# Patient Record
Sex: FEMALE | Race: WHITE | NOT HISPANIC OR LATINO | Employment: STUDENT | URBAN - METROPOLITAN AREA
[De-identification: names, ages, dates, MRNs, and addresses within clinical notes are randomized per-mention and may not be internally consistent; named-entity substitution may affect disease eponyms.]

---
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
## 2024-01-06 ENCOUNTER — APPOINTMENT (EMERGENCY)
Dept: RADIOLOGY | Facility: HOSPITAL | Age: 24
End: 2024-01-06
Payer: COMMERCIAL

## 2024-01-06 ENCOUNTER — HOSPITAL ENCOUNTER (EMERGENCY)
Facility: HOSPITAL | Age: 24
Discharge: HOME | End: 2024-01-07
Attending: EMERGENCY MEDICINE
Payer: COMMERCIAL

## 2024-01-06 VITALS
SYSTOLIC BLOOD PRESSURE: 129 MMHG | TEMPERATURE: 97.9 F | OXYGEN SATURATION: 98 % | HEIGHT: 66 IN | DIASTOLIC BLOOD PRESSURE: 71 MMHG | BODY MASS INDEX: 33.89 KG/M2 | HEART RATE: 88 BPM | RESPIRATION RATE: 20 BRPM

## 2024-01-06 DIAGNOSIS — R10.9 FLANK PAIN: Primary | ICD-10-CM

## 2024-01-06 DIAGNOSIS — R30.0 DYSURIA: ICD-10-CM

## 2024-01-06 DIAGNOSIS — R35.0 URINARY FREQUENCY: ICD-10-CM

## 2024-01-06 LAB
ANION GAP SERPL CALC-SCNC: 6 MEQ/L (ref 3–15)
BACTERIA URNS QL MICRO: 2 /HPF
BASOPHILS # BLD: 0.04 K/UL (ref 0.01–0.1)
BASOPHILS NFR BLD: 0.4 %
BILIRUB UR QL STRIP.AUTO: NEGATIVE MG/DL
BUN SERPL-MCNC: 13 MG/DL (ref 7–25)
CALCIUM SERPL-MCNC: 9.5 MG/DL (ref 8.6–10.3)
CHLORIDE SERPL-SCNC: 107 MEQ/L (ref 98–107)
CLARITY UR REFRACT.AUTO: CLEAR
CO2 SERPL-SCNC: 24 MEQ/L (ref 21–31)
COLOR UR AUTO: YELLOW
CREAT SERPL-MCNC: 0.7 MG/DL (ref 0.6–1.2)
DIFFERENTIAL METHOD BLD: NORMAL
EGFRCR SERPLBLD CKD-EPI 2021: >60 ML/MIN/1.73M*2
EOSINOPHIL # BLD: 0.06 K/UL (ref 0.04–0.36)
EOSINOPHIL NFR BLD: 0.7 %
ERYTHROCYTE [DISTWIDTH] IN BLOOD BY AUTOMATED COUNT: 11.8 % (ref 11.7–14.4)
GLUCOSE SERPL-MCNC: 77 MG/DL (ref 70–99)
GLUCOSE UR STRIP.AUTO-MCNC: NEGATIVE MG/DL
HCG UR QL: NEGATIVE
HCT VFR BLDCO AUTO: 38.8 % (ref 35–45)
HGB BLD-MCNC: 13 G/DL (ref 11.8–15.7)
HGB UR QL STRIP.AUTO: 1
HYALINE CASTS #/AREA URNS LPF: ABNORMAL /LPF
IMM GRANULOCYTES # BLD AUTO: 0.02 K/UL (ref 0–0.08)
IMM GRANULOCYTES NFR BLD AUTO: 0.2 %
KETONES UR STRIP.AUTO-MCNC: NEGATIVE MG/DL
LEUKOCYTE ESTERASE UR QL STRIP.AUTO: NEGATIVE
LYMPHOCYTES # BLD: 2.92 K/UL (ref 1.2–3.5)
LYMPHOCYTES NFR BLD: 31.6 %
MCH RBC QN AUTO: 30.9 PG (ref 28–33.2)
MCHC RBC AUTO-ENTMCNC: 33.5 G/DL (ref 32.2–35.5)
MCV RBC AUTO: 92.2 FL (ref 83–98)
MONOCYTES # BLD: 0.51 K/UL (ref 0.28–0.8)
MONOCYTES NFR BLD: 5.5 %
MUCOUS THREADS URNS QL MICRO: ABNORMAL /LPF
NEUTROPHILS # BLD: 5.68 K/UL (ref 1.7–7)
NEUTS SEG NFR BLD: 61.6 %
NITRITE UR QL STRIP.AUTO: NEGATIVE
NRBC BLD-RTO: 0 %
PDW BLD AUTO: 9.7 FL (ref 9.4–12.3)
PH UR STRIP.AUTO: 6.5 [PH]
PLATELET # BLD AUTO: 209 K/UL (ref 150–369)
POTASSIUM SERPL-SCNC: 3.8 MEQ/L (ref 3.5–5.1)
PROT UR QL STRIP.AUTO: NEGATIVE
RBC # BLD AUTO: 4.21 M/UL (ref 3.93–5.22)
RBC #/AREA URNS HPF: ABNORMAL /HPF
SODIUM SERPL-SCNC: 137 MEQ/L (ref 136–145)
SP GR UR REFRACT.AUTO: 1.01
SQUAMOUS URNS QL MICRO: ABNORMAL /HPF
UROBILINOGEN UR STRIP-ACNC: 0.2 EU/DL
WBC # BLD AUTO: 9.23 K/UL (ref 3.8–10.5)
WBC #/AREA URNS HPF: ABNORMAL /HPF

## 2024-01-06 PROCEDURE — 96374 THER/PROPH/DIAG INJ IV PUSH: CPT | Mod: 59

## 2024-01-06 PROCEDURE — 63600105 HC IODINE BASED CONTRAST: Mod: JZ

## 2024-01-06 PROCEDURE — 84703 CHORIONIC GONADOTROPIN ASSAY: CPT | Performed by: EMERGENCY MEDICINE

## 2024-01-06 PROCEDURE — G1004 CDSM NDSC: HCPCS

## 2024-01-06 PROCEDURE — 80048 BASIC METABOLIC PNL TOTAL CA: CPT | Performed by: EMERGENCY MEDICINE

## 2024-01-06 PROCEDURE — 99284 EMERGENCY DEPT VISIT MOD MDM: CPT | Mod: 25

## 2024-01-06 PROCEDURE — 3E0333Z INTRODUCTION OF ANTI-INFLAMMATORY INTO PERIPHERAL VEIN, PERCUTANEOUS APPROACH: ICD-10-PCS | Performed by: EMERGENCY MEDICINE

## 2024-01-06 PROCEDURE — 63600000 HC DRUGS/DETAIL CODE

## 2024-01-06 PROCEDURE — 85025 COMPLETE CBC W/AUTO DIFF WBC: CPT | Performed by: EMERGENCY MEDICINE

## 2024-01-06 PROCEDURE — 36415 COLL VENOUS BLD VENIPUNCTURE: CPT

## 2024-01-06 PROCEDURE — 85025 COMPLETE CBC W/AUTO DIFF WBC: CPT

## 2024-01-06 PROCEDURE — 81001 URINALYSIS AUTO W/SCOPE: CPT | Performed by: EMERGENCY MEDICINE

## 2024-01-06 RX ORDER — LEVALBUTEROL INHALATION SOLUTION 0.63 MG/3ML
SOLUTION RESPIRATORY (INHALATION)
COMMUNITY
Start: 2020-11-20

## 2024-01-06 RX ORDER — QUETIAPINE FUMARATE 25 MG/1
1 TABLET, FILM COATED ORAL NIGHTLY
COMMUNITY
Start: 2023-09-18

## 2024-01-06 RX ORDER — KETOROLAC TROMETHAMINE 30 MG/ML
30 INJECTION, SOLUTION INTRAMUSCULAR; INTRAVENOUS ONCE
Status: COMPLETED | OUTPATIENT
Start: 2024-01-06 | End: 2024-01-06

## 2024-01-06 RX ORDER — IOPAMIDOL 755 MG/ML
100 INJECTION, SOLUTION INTRAVASCULAR
Status: COMPLETED | OUTPATIENT
Start: 2024-01-06 | End: 2024-01-06

## 2024-01-06 RX ORDER — LORAZEPAM 0.5 MG/1
TABLET ORAL
COMMUNITY
End: 2024-12-12 | Stop reason: ALTCHOICE

## 2024-01-06 RX ORDER — BUDESONIDE 0.5 MG/2ML
INHALANT ORAL
COMMUNITY
Start: 2020-11-20

## 2024-01-06 RX ORDER — EPINEPHRINE 0.3 MG/.3ML
INJECTION SUBCUTANEOUS
COMMUNITY

## 2024-01-06 RX ORDER — NITROFURANTOIN 25; 75 MG/1; MG/1
CAPSULE ORAL
COMMUNITY
Start: 2023-08-29 | End: 2024-11-22 | Stop reason: ALTCHOICE

## 2024-01-06 RX ADMIN — IOPAMIDOL 100 ML: 755 INJECTION, SOLUTION INTRAVENOUS at 23:31

## 2024-01-06 RX ADMIN — KETOROLAC TROMETHAMINE 30 MG: 30 INJECTION INTRAMUSCULAR; INTRAVENOUS at 23:21

## 2024-01-06 ASSESSMENT — ENCOUNTER SYMPTOMS
CHILLS: 0
FATIGUE: 0
COLOR CHANGE: 0
FEVER: 0
HEADACHES: 0
COUGH: 0
FLANK PAIN: 1
LIGHT-HEADEDNESS: 0
PALPITATIONS: 0
SEIZURES: 0
HEMATURIA: 1
EYE PAIN: 0
CONSTIPATION: 0
ARTHRALGIAS: 0
DYSURIA: 1
SORE THROAT: 0
DIARRHEA: 0
DIFFICULTY URINATING: 0
CONFUSION: 0
SHORTNESS OF BREATH: 0
NAUSEA: 1
FREQUENCY: 1
BACK PAIN: 0
WEAKNESS: 0
ABDOMINAL PAIN: 0
VOMITING: 0
DIAPHORESIS: 0
DIZZINESS: 0

## 2024-01-07 NOTE — DISCHARGE INSTRUCTIONS
Your evaluated in the emergency department for flank pain, dysuria, and urinary frequency.  Your CAT scan did not show evidence of kidney stones or pyelonephritis.  Continue taking your antibiotics as directed.  Please follow-up with PCP within 2 to 3 days for reevaluation.  Return to emergency department if you experience worsening pain, difficulty urinating, flank pain, fever 100.4 or greater, or any other concerning symptoms.

## 2024-01-07 NOTE — ED PROVIDER NOTES
HPI   HISTORY OF PRESENT ILLNESS     Patient is a 23-year-old female with past medical history of anorexia and asthma who arrives to the emergency department for right flank pain, urinary frequency, dysuria, and nausea x 3 days.  Patient states she gets frequent UTIs so she called her PCP who prescribed Macrobid and is currently on day 3 of her antibiotic.  Patient states her symptoms were not improving so she came into the ED for reevaluation.  No back pain red flags.  No treatments prior to arrival.  Patient reports pain is a constant, throbbing, aching, 3 out of 10 pain with no radiation or aggravating factors.  Patient had bilateral CVA tenderness on exam.  Patient denies chest pain, shortness of breath, palpitations, cough, fever, chills, headache, vision changes, diaphoresis, lightheadedness, dizziness, numbness, tingling, vomiting, diarrhea, or constipation.      History provided by:  Patient  Flank Pain  Associated symptoms: nausea    Associated symptoms: no abdominal pain, no chest pain, no cough, no diarrhea, no ear pain, no fatigue, no fever, no headaches, no rash, no shortness of breath, no sore throat and no vomiting          Patient History   PAST HISTORY     Reviewed from Nursing Triage:       Past Medical History:   Diagnosis Date   • Anorexia     h/o anorexia last year, not currently anorexic   • Asthma    • Concussion     3 years ago   • Leg length discrepancy    • Syncope, vasovagal     November 2018       History reviewed. No pertinent surgical history.    History reviewed. No pertinent family history.    Social History     Tobacco Use   • Smoking status: Never   • Smokeless tobacco: Never   Substance Use Topics   • Alcohol use: Never   • Drug use: Never         Review of Systems   REVIEW OF SYSTEMS     Review of Systems   Constitutional: Negative for chills, diaphoresis, fatigue and fever.   HENT: Negative for ear pain and sore throat.    Eyes: Negative for pain and visual disturbance.    Respiratory: Negative for cough and shortness of breath.    Cardiovascular: Negative for chest pain and palpitations.   Gastrointestinal: Positive for nausea. Negative for abdominal pain, constipation, diarrhea and vomiting.   Genitourinary: Positive for dysuria, flank pain (Right flank pain), frequency and hematuria. Negative for decreased urine volume, difficulty urinating, pelvic pain, urgency, vaginal bleeding, vaginal discharge and vaginal pain.   Musculoskeletal: Negative for arthralgias, back pain and gait problem.   Skin: Negative for color change and rash.   Neurological: Negative for dizziness, seizures, syncope, weakness, light-headedness and headaches.   Psychiatric/Behavioral: Negative for confusion.   All other systems reviewed and are negative.        VITALS     ED Vitals    Date/Time Temp Pulse Resp BP SpO2 [facility]   01/06/24 2215 36.6 °C (97.9 °F) 88 20 129/71 98 % JLR        Pulse Ox %: 98 % (01/06/24 2228)  Pulse Ox Interpretation: Normal (01/06/24 2228)  Heart Rate: 88 (01/06/24 2228)  Rhythm Strip Interpretation: Normal Sinus Rhythm (01/06/24 2228)     Physical Exam   PHYSICAL EXAM     Physical Exam  Vitals and nursing note reviewed.   Constitutional:       General: She is not in acute distress.     Appearance: Normal appearance. She is well-developed. She is not ill-appearing, toxic-appearing or diaphoretic.   HENT:      Head: Normocephalic and atraumatic.      Nose: No congestion or rhinorrhea.      Mouth/Throat:      Mouth: Mucous membranes are moist.   Eyes:      Extraocular Movements: Extraocular movements intact.      Conjunctiva/sclera: Conjunctivae normal.      Pupils: Pupils are equal, round, and reactive to light.   Cardiovascular:      Rate and Rhythm: Normal rate and regular rhythm.      Pulses: Normal pulses.      Heart sounds: Normal heart sounds.   Pulmonary:      Effort: Pulmonary effort is normal. No respiratory distress.      Breath sounds: Normal breath sounds.   Abdominal:       General: Bowel sounds are normal.      Palpations: Abdomen is soft.      Tenderness: There is no abdominal tenderness. There is right CVA tenderness and left CVA tenderness. There is no guarding or rebound.   Musculoskeletal:         General: No tenderness. Normal range of motion.      Cervical back: Normal range of motion and neck supple.      Right lower leg: No edema.      Left lower leg: No edema.   Skin:     General: Skin is warm and dry.      Capillary Refill: Capillary refill takes less than 2 seconds.   Neurological:      General: No focal deficit present.      Mental Status: She is alert and oriented to person, place, and time.   Psychiatric:         Mood and Affect: Mood normal.         Behavior: Behavior normal.           PROCEDURES     Procedures     DATA     Results     Procedure Component Value Units Date/Time    Urinalysis with Reflex Culture [[id number]]  (Abnormal) Collected: 01/06/24 2230    Specimen: Urine, Clean Catch Updated: 01/06/24 2244    Narrative:      The following orders were created for panel order Urinalysis with Reflex Culture.  Procedure                               Abnormality         Status                     ---------                               -----------         ------                     UA Reflex to Culture (Ma...[[id number]]  Abnormal            Final result               UA Microscopic[[id number]]               Abnormal            Final result                 Please view results for these tests on the individual orders.    UA Microscopic [[id number]]  (Abnormal) Collected: 01/06/24 2230    Specimen: Urine, Clean Catch Updated: 01/06/24 2244     RBC, Urine 5 TO 9 /HPF      WBC, Urine 0 TO 3 /HPF      Squamous Epithelial Rare /hpf      Hyaline Cast None Seen /lpf      Bacteria, Urine +2 /HPF      Mucus Rare /LPF     UA Reflex to Culture (Macroscopic) [[id number]]  (Abnormal) Collected: 01/06/24 2230    Specimen: Urine, Clean Catch Updated: 01/06/24 2239     Color, Urine Yellow      Clarity, Urine Clear     Specific Gravity, Urine 1.010     pH, Urine 6.5     Leukocyte Esterase Negative     Comment: Results can be falsely negative due to high specific gravity, some antibiotics, glucose >3 g/dl, or WBC other than neutrophils.        Nitrite, Urine Negative     Protein, Urine Negative     Glucose, Urine Negative mg/dL      Ketones, Urine Negative mg/dL      Urobilinogen, Urine 0.2 EU/dL      Bilirubin, Urine Negative mg/dL      Blood, Urine +1     Comment: The sensitivity of the occult blood test is equivalent to approximately 4 intact RBC/HPF.       CBC and differential [[id number]] Collected: 01/06/24 2221    Specimen: Blood, Venous Updated: 01/06/24 2230     WBC 9.23 K/uL      RBC 4.21 M/uL      Hemoglobin 13.0 g/dL      Hematocrit 38.8 %      MCV 92.2 fL      MCH 30.9 pg      MCHC 33.5 g/dL      RDW 11.8 %      Platelets 209 K/uL      MPV 9.7 fL      Differential Type Auto     nRBC 0.0 %      Immature Granulocytes 0.2 %      Neutrophils 61.6 %      Lymphocytes 31.6 %      Monocytes 5.5 %      Eosinophils 0.7 %      Basophils 0.4 %      Immature Granulocytes, Absolute 0.02 K/uL      Neutrophils, Absolute 5.68 K/uL      Lymphocytes, Absolute 2.92 K/uL      Monocytes, Absolute 0.51 K/uL      Eosinophils, Absolute 0.06 K/uL      Basophils, Absolute 0.04 K/uL     Basic metabolic panel [[id number]] Collected: 01/06/24 2221    Specimen: Blood, Venous Updated: 01/06/24 2226    BhCG, Serum, Qual (if menstruating female and no urine) [[id number]] Collected: 01/06/24 2221    Specimen: Blood, Venous Updated: 01/06/24 2226          Imaging Results    None         No orders to display       Scoring tools                                  ED Course & MDM   MDM / ED COURSE / CLINICAL IMPRESSION / DISPO     Medical Decision Making  Impression: Patient is alert and oriented.  Patient presents to the ED for right flank pain, urinary frequency, dysuria, and nausea x 3 days.  Patient states she gets frequent UTIs  so she called her PCP who prescribed Macrobid and is currently on day 3 of her antibiotic.  Patient states her symptoms were not improving so she came into the ED for reevaluation.  No back pain red flags.  No treatments prior to arrival.  Differential diagnoses include UTI, renal colic, pyelonephritis.  Plan to obtain UA, laboratory studies, and CT ab/pelvis.  PERRLA.  Lungs are clear in all fields.  Patient can ambulate without difficulty.  Bilateral CVA tenderness.  Bowel sounds present all 4 quadrants.  Abdomen soft and nontender.  Patient is afebrile on exam.  VSS with SpO2 of 98% on room air. Blood +1, RBC 5-9, bacteria +2.  CBC obtained unremarkable.  BMP obtained unremarkable.  CT ab/pelvis with pulmonary impression: Increased stool.  No bowel distention, free fluid, free air or hydronephrosis.  Normal appendix.  No thickening of the urinary bladder wall.  Plan: CBC, CMP, pregnancy test, UA, CT ab/pelvis, reassess  Dispo: Discharge home with return precautions.  Follow-up with PCP in 2 to 3 days for reevaluation.  Continue taking antibiotics as directed.  Diagnosis and management discussed with patient who understood and is comfortable with discharge plan.    Dysuria: acute illness or injury  Flank pain: acute illness or injury  Urinary frequency: acute illness or injury  Amount and/or Complexity of Data Reviewed  Labs: ordered. Decision-making details documented in ED Course.  Radiology: ordered and independent interpretation performed.      Risk  Prescription drug management.          Vital Signs Review: Vital signs have been reviewed. The oxygen saturation is SpO2: 98 % which is normal.    ED Course as of 01/06/24 2356   Sat Jan 06, 2024   2228 Patient evaluated for right flank pain, urinary frequency, dysuria, and nausea x 3 days.  Patient states she gets frequent UTIs so she called her PCP who prescribed Macrobid and is currently on day 3 of her antibiotic.  Patient states her symptoms were not  improving so she came into the ED for reevaluation.  No back pain red flags.  No treatments prior to arrival. [JG]   2247 Blood, Urine(!): +1 [JG]   2247 RBC, Urine(!): 5 TO 9 [JG]   2247 Bacteria, Urine(!): +2 [JG]   2353 CT ab/pelvis Patient Name: Kishan Cade  Exam(s): a/p standard CT  MR#: [id number]       History: Flank pain, r/o pyelonephritis Patient is a 23-year-old female with past medical history of anorexia and asthma who arrives to the emergency department for right flank pain, urinary frequency, dysuria, and nausea x 3 days.  Patient states she gets frequent UTIs so she called her PCP who prescribed Macrobid and is currently on day 3 of her antibiotic.  Patient states her symptoms were not improving so she came into the ED for reevaluation.  No back pain red flags.  No treatments prior to arrival.  Patient reports pain is a constant, throbbing, aching, 3 out of 10 pain with no radiation or aggravating factors.  Patient had bilateral CVA tenderness on exam.  Patient denies chest pain, shortness of breath, palpitations, cough, fever, chills, headache, vision changes, diaphoresis, lightheadedness, dizziness, numbness, tingling, vomiting, diarrhea, or constipation.    Preliminary Impression:    Increased stool.    No bowel distention, free fluid, free air or hydronephrosis. Normal appendix.    Normal enhancement and appearance to the kidneys. No perinephric infiltration. No thickening of the urinary bladder wall.      Interpreted by: Yana Lovell MD, Jan 06, 2024 11:50 PM     [JG]   2935 Diagnosis and management discussed with patient who understood and is comfortable with discharge plan. [JG]      ED Course User Index  [JG] Evan Vieira CRNP     Clinical Impression      None               Evan Vieira CRNP  01/07/24 0000

## 2024-11-22 ENCOUNTER — OFFICE VISIT (OUTPATIENT)
Dept: GYNECOLOGY | Facility: CLINIC | Age: 24
End: 2024-11-22
Payer: COMMERCIAL

## 2024-11-22 VITALS
BODY MASS INDEX: 31.82 KG/M2 | HEART RATE: 97 BPM | OXYGEN SATURATION: 99 % | DIASTOLIC BLOOD PRESSURE: 78 MMHG | WEIGHT: 191 LBS | HEIGHT: 65 IN | TEMPERATURE: 97.6 F | SYSTOLIC BLOOD PRESSURE: 120 MMHG

## 2024-11-22 DIAGNOSIS — R30.0 DYSURIA: Primary | ICD-10-CM

## 2024-11-22 DIAGNOSIS — N94.819 VULVODYNIA: ICD-10-CM

## 2024-11-22 DIAGNOSIS — R10.2 PELVIC PAIN IN FEMALE: ICD-10-CM

## 2024-11-22 DIAGNOSIS — Z80.3 FAMILY HISTORY OF BREAST CANCER: ICD-10-CM

## 2024-11-22 PROCEDURE — 99205 OFFICE O/P NEW HI 60 MIN: CPT | Performed by: PHYSICIAN ASSISTANT

## 2024-11-22 PROCEDURE — 3008F BODY MASS INDEX DOCD: CPT | Performed by: PHYSICIAN ASSISTANT

## 2024-11-22 ASSESSMENT — PAIN SCALES - GENERAL: PAINLEVEL_OUTOF10: 0-NO PAIN

## 2024-11-22 NOTE — PROGRESS NOTES
[facility] Problem Note      Visit Date: 11/22/2024   Kishan Cade is 24 y.o. female presenting today for Endometriosis      Referral: Naomie Ferrer DPT      Pertinent History:   Pt G0, she/her, presents with her friend Emily, to discuss pelvic pain, urinary symptoms, and suspicion for endometriosis.  She reports her symptoms got significantly worse about 2 years ago following a kidney infection.  Her primary concern is urinary symptoms.  She describes daily urinary symptoms associated with burning, frequency.  They will worsen around her periods and impact her quality of life.  She has seen urogynecology, Dr. Woo at [facility].  She had a cystoscopy that diagnosed her with interstitial cystitis.  She saw a specialist Dr. Suri Cole, who referred her to pelvic floor PT. she has been doing pelvic floor PT regularly for 3 months with some minimal benefit in symptoms.    Menarche 15 years old and she reports a lifetime history of severe dysmenorrhea with associated nausea and vomiting.  She was started on a OLIVIA in eighth grade due to her severe menstrual symptoms and was on that for many years.  She transition to Kyleena IUD in 2019 due to still having bleeding and cramping and emotional side effects on the pills.  Initially did well on the Kyleena for about 3 years-she was amenorrheic and had improvement in her pain.  Then she started to have daily noncyclic pain and had the IUD removed.  She has not been on hormonal contraceptive for the last 1.5 years.  In this time her menstrual and urinary symptoms have worsened.  She is dating a long-term male partner at this time.  Is unsure about future fertility plans.      Menstrual History  Patient states that her periods occur every 28-30 days  Her periods are regular   She states her periods are moderate requiring her to use tampon every 3-4 hours on her two heavier days  She is not currently using birth control - condoms, timed intercourse  She does not  "have a known history of fibroids      Urinary Symptoms  Frequency: Yes  Dysuria: Yes - significant \"burning\"  Hematuria: Yes    Bowel Symptoms  Bowel movements are irregular occurring about every 3-5 days   Constipation: Yes  Stool softener: No - has hx of eating disorder so hesitant to use laxatives  Diarrhea: No  Dyschezia: Yes  Hematochezia: No  Upper GI symptoms: nausea, vomiting    Sexual Activity  Patient is sexually active  Dyspareunia: Yes, describe: most notable is burning the day after she has sex    Lower extremity symptoms:   Left hip pain - has hx of hip dysplasia    Upper abdominal/Thoracic symptoms:  no    Previous medical therapies:  OLIVIA, Kyleena IUD      Imaging Studies    Previous Ultrasound yes: Findings: 2021, no abnormal findings      Abd/Pelvic CTS:  Impression        No acute findings in the abdomen or pelvis. No measurable urolithiasis.  Narrative    CT OF THE ABDOMEN AND PELVIS WITHOUT CONTRAST    CLINICAL INFORMATION: Flank pain    PROCEDURE: CT examination of the abdomen and pelvis was performed without intravenous contrast. Enteric contrast was not administered.    INTRAVENOUS CONTRAST: Not administered.    COMPARISON: None    The lack of intravenous contrast limits the evaluation of the solid organs.    FINDINGS:    LOWER THORAX: The lung bases are clear.    LIVER: Normal in size and configuration.        BILE DUCTS: No intrahepatic or extrahepatic bile duct dilation.    GALLBLADDER: No calcified gallstones. Normal wall thickness.    PANCREAS: Within limits of an unenhanced exam, no focal pancreatic mass is identified.    SPLEEN: Normal size.    ADRENAL GLANDS: Within normal limits.    KIDNEYS/URETERS: No hydroureteronephrosis. No radiopaque urinary tract calculi.    BLADDER: Within normal limits.    REPRODUCTIVE ORGANS: The uterus is grossly normal. No adnexal mass.    BOWEL: No bowel dilatation. No adjacent inflammatory change. Normal appendix.    PERITONEUM/RETROPERITONEUM: No fluid " collection, ascites, or pneumoperitoneum.    LYMPH NODES: No abdominal or pelvic lymphadenopathy, by imaging size criteria.    VESSELS: Limited assessment without contrast. No abdominal aortic aneurysm [ABAN00].    ABDOMINAL/PELVIC WALL: Within limits of normal.    BONES: No aggressive osseous lesion.      Stated medical history: anxiety, depression (sees therapist and psychyatrist regularly), eating disorder (well controlled), dysuria, Hashimotos    Stated surgical history: wisdom teeth, septal repair    Stated social history: Nonsmoker    Patient has a history of surgically confirmed endometriosis: no    Current Medical Treatments:  Non-Narcotics: nsaid, tylenol  Narcotics: no  Hormonal: no  Neuroleptics: no        Menstrual History:  OB History          0    Para   0    Term   0       0    AB   0    Living   0         SAB   0    IAB   0    Ectopic   0    Multiple   0    Live Births   0                Patient's last menstrual period was 2024.     Fam hx:   Mother with hx of breast cancer at 51 yo - unsure about genetic testing, aaw  PGM and all pat great aunts with breast cancer, PGM dx <51 yo, no genetic testing done    Medications:   Current Outpatient Medications:     budesonide (PULMICORT) 0.5 mg/2 mL nebulizer solution, inhale contents of 1 vial ( 2 milliliters ) in nebulizer twice a day if needed, Disp: , Rfl:     desvenlafaxine succinate (PRISTIQ) 50 mg 24 hr ER tablet, Take 50 mg by mouth daily. 100 mg day, Disp: , Rfl:     EPINEPHrine (EPIPEN) 0.3 mg/0.3 mL injection syringe, INJECT ONE PEN INJECTOR INTRAMUSCULARLY ONCE AS AS NEEDED, Disp: , Rfl:     levalbuterol (XOPENEX) 0.63 mg/3 mL nebulizer solution, inhale orally contents of 1 vial in nebulizer every 6 hours if needed, Disp: , Rfl:     LORazepam (ATIVAN) 0.5 mg tablet, take 1 tablet by mouth once daily if needed for anxiety, Disp: , Rfl:     QUEtiapine (SEROquel) 25 mg tablet, Take 1 tablet by mouth nightly. Half of 25 mg, Disp:  ", Rfl:     fluticasone propionate (FLONASE) 50 mcg/actuation nasal spray, Administer 1 spray into each nostril daily., Disp: 16 g, Rfl: 0    levothyroxine (SYNTHROID) 75 mcg tablet, Take daily on an empty stomach, Disp: , Rfl:     lithium 600 mg capsule, Take 600 mg by mouth 2 (two) times a day., Disp: , Rfl:     lithium carbonate 150 mg capsule, TAKE 1 CAPSULE BY MOUTH EVERY EVENING TAKE WITH 600 MG IN THE EVENING, Disp: , Rfl:     lumateperone (CAPLYTA) 42 mg capsule, Take 42 mg by mouth daily., Disp: , Rfl:     nitrofurantoin, macrocrystal-monohydrate, (MACROBID) 100 mg capsule, Take 1 capsule every 12 hours by oral route for 5 days., Disp: , Rfl:     Allergies: is allergic to gluten and tree nuts.     Past Medical History:  has a past medical history of Anorexia, Asthma, Concussion, Leg length discrepancy, and Syncope, vasovagal. Hashimotos  Past Surgical History:  has no past surgical history on file.  Social History:   Social History     Tobacco Use    Smoking status: Never    Smokeless tobacco: Never   Substance Use Topics    Alcohol use: Never    Drug use: Never         Review of Systems       Visit Vitals  /78 (BP Location: Right upper arm, Patient Position: Sitting)   Pulse 97   Temp 36.4 °C (97.6 °F) (Temporal)   Ht 1.651 m (5' 5\")   Wt 86.6 kg (191 lb)   LMP 11/01/2024   SpO2 99%   BMI 31.78 kg/m²       Physical Exam  Constitutional: Patient appears healthy, alert, not in any sign of acute distress, and well developed  Neuro: Patient is alert and oriented x3  Psych: Normal affect with normal behavior and no psychotic features  Skin: Good color no obvious lesions  Neck: Normal in appearance  Lungs: Respiratory effort normal  Abdomen: Soft, nondistended or tender    Pelvic exam:  External genitalia: normal architecture, good retraction of clitoral calzada, estrogenic appearance, normal in general appearance  Vagina: Healthy appearing mucosa with normal rugation, no evidence of abnormal discharge or " lesions.   Cervix: Normal in appearance, no CMT, no lesions  Uterus: Normal size, mobile, some vague tenderness  Adnexa: No evidence of fullness or irregular mass  Pelvic floor: vulvodynia 9-3oclock +burning sensation, left side hypertonic and ttp    Extremities: Normal in appearance no obvious edema  Lymph nodes: No obvious inguinal lymphadenopathy        A chaperone was present for this exam: Yes      Assessment/Plan     Kishan Cade is a 24 y.o. year old patient here today for discussion of pelvic pain and urinary symptoms.    Today we had a lengthy conversation discussing endometriosis and adenomyosis.  We spent time reviewing the pathophysiology of both of these conditions as well as the similarities and differences between these two chronic medical conditions.  I spent time highlighting the multisystem effects that these conditions can have on an individual and how my approach to care often focuses on the needed multidisciplinary approach to improving quality of life.  We spent time discussing the management strategies for both of these conditions which include but are certainly not limited to medical management versus surgical management or very often a combination of both.  I explained that in many instances my abilities as a gynecologist to manage this condition will be limited to the conditions themselves and I outlined how I often need to rely on partners such as pelvic floor physical therapy, pain management, or medical physicians to target the indirect consequences of these conditions.  We discussed the similarities and differences between my ability to surgically manage endometriosis and adenomyosis we also discussed the two most common types of endometriosis being either superficial or deeply infiltrating disease and how this affects the surgical complexity of a procedure.  I outlined the chronic estrogen dependent and highly inflammatory nature of these conditions and because ultimately these  conditions may not be curable for everyone I detailed that my focus will always be on my patient's quality of life.    She has previously trialed different modes of hormonal modification and has unfortunately had side effects.  I did review the option of a trial of norethindrone-I let her know we could start on a low-dose and titrate up as needed and as tolerated.  For example, we can start with 2.5 mg daily.  I also reviewed Orilissa, how it works and the side effect profile.  I did review that it can cause worsening mood symptoms, which I would want her to watch closely.    At this time she wants to consider her options.  She is interested in surgery but unsure she definitively wants to move in this direction.  She would like to start with getting an MRI for further evaluation for deeply infiltrating endometriosis.      Plan  Pelvic pain:  MRI ordered.  I discussed that we use this imaging to evaluate for deeply infiltrating endometriosis involving the bowel or bladder that may require colorectal or urology to be available at the time of her surgery.  As above, I did review some medication management options to help improve her symptoms.  She wants to consider this for now.  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  I discussed adding a compound ointment that will be sent to [facility].  I recommend that she apply this 2 times a day.  I suggest she make an appointment with Dr. Siddiqui or Shala Liao regarding her vulvodynia and dysuria symptoms.    Dysuria:  See above    4. Family hx of breast cancer:  Referral provided for genetic counseling    RTO 4 weeks - MRI review, follow up on ointment, revisit surgical intervention    I spent over 50% of this encounter preparing to see this patient (i.e. reviewing prior notes, outside records, lab results, imaging studies, operative reports, etc.), performing an in-person evaluation, documenting my recommendations, placing  orders (i.e. lab tests, imaging studies, referrals, etc) and coordinating care. Total time: 60 minutes                TG Hernandez C

## 2024-11-25 ENCOUNTER — TELEPHONE (OUTPATIENT)
Dept: GYNECOLOGY | Facility: CLINIC | Age: 24
End: 2024-11-25
Payer: COMMERCIAL

## 2024-12-06 ENCOUNTER — HOSPITAL ENCOUNTER (OUTPATIENT)
Dept: RADIOLOGY | Facility: CLINIC | Age: 24
Discharge: HOME | End: 2024-12-06
Attending: PHYSICIAN ASSISTANT
Payer: COMMERCIAL

## 2024-12-06 DIAGNOSIS — R10.2 PELVIC PAIN IN FEMALE: ICD-10-CM

## 2024-12-06 DIAGNOSIS — R30.0 DYSURIA: ICD-10-CM

## 2024-12-11 RX ORDER — LORAZEPAM 0.5 MG/1
0.5 TABLET ORAL ONCE AS NEEDED
Qty: 2 TABLET | Refills: 0 | Status: SHIPPED | OUTPATIENT
Start: 2024-12-11

## 2024-12-12 ENCOUNTER — OFFICE VISIT (OUTPATIENT)
Dept: GYNECOLOGY | Facility: CLINIC | Age: 24
End: 2024-12-12
Payer: COMMERCIAL

## 2024-12-12 DIAGNOSIS — N94.10 DYSPAREUNIA IN FEMALE: ICD-10-CM

## 2024-12-12 DIAGNOSIS — N30.10 INTERSTITIAL CYSTITIS: ICD-10-CM

## 2024-12-12 DIAGNOSIS — N94.819 VULVODYNIA: Primary | ICD-10-CM

## 2024-12-12 DIAGNOSIS — N80.9 ENDOMETRIOSIS: ICD-10-CM

## 2024-12-12 DIAGNOSIS — R10.2 PELVIC PAIN IN FEMALE: ICD-10-CM

## 2024-12-12 DIAGNOSIS — R39.89 URETHRAL PAIN: ICD-10-CM

## 2024-12-12 PROBLEM — M25.531 RIGHT WRIST PAIN: Status: ACTIVE | Noted: 2023-12-13

## 2024-12-12 PROBLEM — F41.1 GENERALIZED ANXIETY DISORDER: Status: ACTIVE | Noted: 2024-06-21

## 2024-12-12 PROBLEM — J45.909 ASTHMA: Status: ACTIVE | Noted: 2023-07-18

## 2024-12-12 PROBLEM — J45.909 ASTHMA: Status: RESOLVED | Noted: 2023-07-18 | Resolved: 2024-12-12

## 2024-12-12 PROBLEM — M54.2 CERVICALGIA: Status: RESOLVED | Noted: 2024-12-12 | Resolved: 2024-12-12

## 2024-12-12 PROBLEM — R55 VASOVAGAL SYNCOPE: Status: ACTIVE | Noted: 2024-12-12

## 2024-12-12 PROBLEM — F32.81 PMDD (PREMENSTRUAL DYSPHORIC DISORDER): Status: ACTIVE | Noted: 2024-12-12

## 2024-12-12 PROBLEM — E03.9 HYPOTHYROIDISM, UNSPECIFIED: Status: ACTIVE | Noted: 2021-09-27

## 2024-12-12 PROBLEM — S06.0X1A CONCUSSION WITH LOSS OF CONSCIOUSNESS OF 30 MINUTES OR LESS, INITIAL ENCOUNTER: Status: RESOLVED | Noted: 2024-12-12 | Resolved: 2024-12-12

## 2024-12-12 PROBLEM — R11.14 BILIOUS VOMITING WITH NAUSEA: Status: RESOLVED | Noted: 2024-12-12 | Resolved: 2024-12-12

## 2024-12-12 PROBLEM — K59.00 CONSTIPATION, UNSPECIFIED: Status: ACTIVE | Noted: 2024-12-12

## 2024-12-12 PROBLEM — F32.A DEPRESSIVE DISORDER: Status: ACTIVE | Noted: 2023-07-18

## 2024-12-12 PROBLEM — J45.31 MILD PERSISTENT ASTHMA WITH ACUTE EXACERBATION: Status: ACTIVE | Noted: 2024-06-21

## 2024-12-12 PROBLEM — S06.0X0D CONCUSSION WITHOUT LOSS OF CONSCIOUSNESS, SUBSEQUENT ENCOUNTER: Status: RESOLVED | Noted: 2024-12-12 | Resolved: 2024-12-12

## 2024-12-12 PROBLEM — N92.1 METRORRHAGIA: Status: ACTIVE | Noted: 2024-12-12

## 2024-12-12 PROBLEM — R07.81 PLEURITIC CHEST PAIN: Status: ACTIVE | Noted: 2024-06-21

## 2024-12-12 PROBLEM — S52.509A CLOSED FRACTURE OF DISTAL END OF RADIUS: Status: RESOLVED | Noted: 2023-12-13 | Resolved: 2024-12-12

## 2024-12-12 PROBLEM — S97.82XS: Status: RESOLVED | Noted: 2024-12-12 | Resolved: 2024-12-12

## 2024-12-12 PROBLEM — H90.11 CONDUCTIVE HEARING LOSS IN RIGHT EAR: Status: ACTIVE | Noted: 2024-12-12

## 2024-12-12 PROBLEM — R10.9 ABDOMINAL PAIN: Status: RESOLVED | Noted: 2024-12-12 | Resolved: 2024-12-12

## 2024-12-12 PROCEDURE — 99215 OFFICE O/P EST HI 40 MIN: CPT | Performed by: NURSE PRACTITIONER

## 2024-12-12 RX ORDER — PHENAZOPYRIDINE HYDROCHLORIDE 100 MG/1
100 TABLET, FILM COATED ORAL 3 TIMES DAILY PRN
COMMUNITY
Start: 2024-01-11

## 2024-12-12 RX ORDER — TRETINOIN 0.25 MG/G
0.03 CREAM TOPICAL NIGHTLY
COMMUNITY

## 2024-12-12 ASSESSMENT — PAIN SCALES - GENERAL: PAINLEVEL_OUTOF10: 0-NO PAIN
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
## 2024-12-12 NOTE — PATIENT INSTRUCTIONS
"[employer]    Podcasts- Vanna Hinds \"You are not broken\"  Social media- Dr. Roxie Whalen    Book- Why sex hurts?  "

## 2024-12-13 ENCOUNTER — HOSPITAL ENCOUNTER (OUTPATIENT)
Dept: RADIOLOGY | Facility: HOSPITAL | Age: 24
Discharge: HOME | End: 2024-12-13
Attending: PHYSICIAN ASSISTANT
Payer: COMMERCIAL

## 2024-12-13 VITALS — HEIGHT: 65 IN | WEIGHT: 190 LBS | BODY MASS INDEX: 31.65 KG/M2

## 2024-12-13 RX ORDER — GADOBUTROL 604.72 MG/ML
0.1 INJECTION INTRAVENOUS ONCE
Status: COMPLETED | OUTPATIENT
Start: 2024-12-13 | End: 2024-12-13

## 2024-12-13 RX ADMIN — GADOBUTROL 8.6 ML: 604.72 INJECTION INTRAVENOUS at 08:19

## 2024-12-13 NOTE — PROGRESS NOTES
"Kishan Cade   [id number]    Your doctor has referred you for a MRI PELVIS W WO CONTRAST that requires the injection of a contrast material into your bloodstream. This contrast material, sometimes referred to as \"x-ray dye\" allows for better interpretation and results in a more accurate interpretation of the examination.     Without the use of contrast, the examination may be less informative and result in a suboptimal examination, and possibly a delay in diagnosis and, if needed, treatment.     The contrast material is given through a small needle or catheter placed into a vein, usually on the inside of the elbow, on the back of hand, or in a vein in the foot or lower leg.    The most common, though still rare, potential reaction to an intravenous contrast injection is an allergic-like reaction. Most allergic-like reactions are mild, though a small subset of people can have more severe reactions. Mild reactions include mild / scattered hives, itching, scratchy throat, sneezing and nasal congestion. More severe reactions include facial swelling, severe difficulty breathing, wheezing and anaphylactic shock. Those with a prior history allergic-like reaction to the same class of contrast media (such as CT contrast or MRI contrast) are at the highest risk for an allergic reaction.     If you believe you had an allergic reaction to contrast in the past, please let our staff know. We can determine if this increases your risk for a future reaction and provide steps to decrease the risk. This may delay your examination, but it decreases the risk of having a new and possibly more severe reaction to the contrast injection.    People with a history of prior allergic reactions to other substances (such as unrelated medications and food) and patients with a history of asthma have slightly increased risk for an allergic reaction from contrast material when compared with the general population, but do not require to be " pretreated prior to a contrast injection.    You should notify the physician, nurse or technologist if you have ever had any of these conditions or if you have any questions.

## 2024-12-18 ENCOUNTER — OFFICE VISIT (OUTPATIENT)
Dept: GYNECOLOGY | Facility: CLINIC | Age: 24
End: 2024-12-18
Payer: COMMERCIAL

## 2024-12-18 VITALS
WEIGHT: 191 LBS | OXYGEN SATURATION: 99 % | DIASTOLIC BLOOD PRESSURE: 68 MMHG | SYSTOLIC BLOOD PRESSURE: 116 MMHG | TEMPERATURE: 98.2 F | BODY MASS INDEX: 31.82 KG/M2 | HEIGHT: 65 IN | HEART RATE: 85 BPM

## 2024-12-18 DIAGNOSIS — R10.2 PELVIC PAIN IN FEMALE: ICD-10-CM

## 2024-12-18 DIAGNOSIS — N94.819 VULVODYNIA: ICD-10-CM

## 2024-12-18 DIAGNOSIS — N94.819 VULVODYNIA: Primary | ICD-10-CM

## 2024-12-18 DIAGNOSIS — N30.10 INTERSTITIAL CYSTITIS: ICD-10-CM

## 2024-12-18 PROCEDURE — 3008F BODY MASS INDEX DOCD: CPT | Performed by: PHYSICIAN ASSISTANT

## 2024-12-18 PROCEDURE — 99215 OFFICE O/P EST HI 40 MIN: CPT | Performed by: PHYSICIAN ASSISTANT

## 2024-12-18 ASSESSMENT — PAIN SCALES - GENERAL: PAINLEVEL_OUTOF10: 0-NO PAIN

## 2024-12-18 NOTE — PATIENT INSTRUCTIONS
On MRI - some subtle signs of focal adenomyosis - this is endometriosis in the muscle of the uterus.  This can be managed hormonally.  Definitive management can include a hysterectomy (at some point).    Having adenomyosis increases suspicion of having endometriosis other placed in your pelvis.

## 2024-12-18 NOTE — PROGRESS NOTES
"[facility]  Return GYN Note      Name: Kishan Cade    : 2000   MRN: [id number]      Subjective    Patient ID: Kishan Cade is a 24 y.o. female following up regarding pelvic pain, IC, vulvodynia and MRI review.      Since her last visit with me, she had an appointment with ELYSSA Laboy and was pleased with the visit.  She reports they plan to start management for interstitial cystitis and vulvodynia.  She is getting a topical estrogen-testosterone cream and vaginal Valium supp[facility]-she has not received these yet, I will follow-up on this.    We reviewed her MRI together.  I let her know I had a chance to review her MRI pictures together with Dr. Ortega.  They do show some suspicion for focal adenomyosis.  We discussed the implications of this disease and the connection with endometriosis.  She has currently not been on hormonal birth control for about a year.  While she does note her period symptoms have gotten progressively worse, her mood symptoms, specifically depression and anxiety, have been much better controlled.  She is still hesitant to resume hormonal birth control due to potential impact on her mood symptoms.    Overall, she feels it is her urinary symptoms and vulvodynia symptoms that are most impacting her quality of life at this time.  She would like to start with focusing on these symptoms and management.             Objective    Visit Vitals  /68 (BP Location: Right upper arm, Patient Position: Sitting)   Pulse 85   Temp 36.8 °C (98.2 °F) (Temporal)   Ht 1.651 m (5' 5\")   Wt 86.6 kg (191 lb)   LMP 2024   SpO2 99%   BMI 31.78 kg/m²         Physical Exam   General: well appearing, normal affect   Head/Neck: normocephalic   Pulmonary: normal effort   Abdomen: grossly nondistended  Neurologic: grossly normal   Skin: no visible lesions   Extremities: no edema      Data Review:   Imaging:    CLINICAL HISTORY:  24-year-old female " with endometriosis, pelvic pain and  dysuria; evaluate for adenomyosis for surgical planning.     COMPARISON:  Enhanced CT of abdomen and pelvis dated 1/6/2024; transabdominal  and transvaginal ultrasound examination of pelvis dated 2/8/2021.     TECHNIQUE:  Multiplanar, multisequence MRI of the pelvis is performed prior to  and following the uneventful administration of intravenous contrast at 1.5  Lizzy.     CONTRAST:  8.6 mL Gadavist IV contrast     COMMENT:     Uterus:  Anteverted and normal in size. Uterus measures approximately 3.9 x 5.1  x 6.8 cm. Arcuate configuration of uterus is suspected. No uterine mass or myoma  is seen.     Inner Myometrium/Junctional Zone:  No definite thickening to suggest  adenomyosis.     Endometrium:  Normal in thickness. Endometrium measures 7 mm in maximal  thickness.     Cervix:  Unremarkable.     Ovaries:  Ovoid, thick-walled and peripherally enhancing lesion in left ovary  measures 2.1 x 1.7 cm and most likely represents a corpus luteum or involuting  physiologic cyst. Multiple follicles are noted in both ovaries.     Peritoneum, Retroperitoneum and Lymph Nodes:  Small volume of pelvic ascites  could be physiologic. No definite endometrial implants or secondary findings of  endometriosis. No suspect lymphadenopathy. Prominent but subcentimeter external  iliac lymph nodes, bilaterally, do not meet imaging criteria for pathologic  enlargement and are likely reactive.     Osseous Structures:  No suspicious findings. Heterogeneous enhancement around  both ischial tuberosities suggests tendinosis at origins of bilateral hamstring  tendons.     Additional Findings:  None.     --  IMPRESSION:     1.  Normally sized, anteverted uterus with possible arcuate configuration. No  evidence of uterine adenomyosis.  2.  2.1 x 1.7 cm probable corpus luteum in left ovary.  3.  Small volume of pelvic ascites.  4.  See additional comments above.       Assessment/Plan     Dysmenorrhea:  We  reviewed pictures of her MRI together.  I also reviewed my conversation with Dr. Ortega.  Regarding potential for surgery in the future, he does not feel any additional surgeons would be necessary and she could schedule at any location.  She does want to keep surgical intervention in mind for the future, but does not want a plan that at this time.  We discussed the possible finding of adenomyosis and the increased association with endometriosis.  I reviewed that definitive management for adenomyosis is a hysterectomy.  She is not ready for this definitive step at this time.  So, if we were to pursue endometriosis excision, she may still require medical suppression for this adenomyosis in the uterus.  She voices understanding.  We reviewed medical suppression options.  Given her concern for mood symptoms, I would suggest a trial of Slynd.  I also discussed norethindrone as a possibility, I would start on a very low-dose potentially 2.5 mg daily just to confirm there are no negative mood symptoms for her.  She wants to consider this in the future as well, but wants to follow-up with ELYSSA Laboy first.    Vulvodynia:  Continue management with ELYSSA Laboy.  I will send her a follow-up message regarding her prescriptions at [facility].      I spent over 50% of this encounter preparing to see this patient (i.e. reviewing prior notes, outside records, lab results, imaging studies, operative reports, etc.), performing an in-person evaluation, documenting my recommendations, placing orders (i.e. lab tests, imaging studies, referrals, etc) and coordinating care. Total time: 40 minutes    Follow up: 6 weeks with ELYSSA Laboy.    This note was dictated. Although it was briefly reviewed for accuracy, please excuse transcription, spelling, and grammatical errors. Please contact my office if any clarification is needed - [phone].     TG Hernandez      cc: No ref. provider  found         Ike Aly MD

## 2025-04-09 ENCOUNTER — HOSPITAL ENCOUNTER (EMERGENCY)
Facility: HOSPITAL | Age: 25
Discharge: HOME | End: 2025-04-09
Attending: EMERGENCY MEDICINE | Admitting: EMERGENCY MEDICINE
Payer: COMMERCIAL

## 2025-04-09 ENCOUNTER — APPOINTMENT (EMERGENCY)
Dept: RADIOLOGY | Facility: HOSPITAL | Age: 25
End: 2025-04-09
Attending: EMERGENCY MEDICINE
Payer: COMMERCIAL

## 2025-04-09 VITALS
RESPIRATION RATE: 16 BRPM | HEIGHT: 65 IN | BODY MASS INDEX: 29.99 KG/M2 | SYSTOLIC BLOOD PRESSURE: 113 MMHG | DIASTOLIC BLOOD PRESSURE: 67 MMHG | TEMPERATURE: 98.4 F | HEART RATE: 70 BPM | OXYGEN SATURATION: 100 % | WEIGHT: 180 LBS

## 2025-04-09 DIAGNOSIS — N83.201 CYST OF RIGHT OVARY: Primary | ICD-10-CM

## 2025-04-09 LAB
ALBUMIN SERPL-MCNC: 4.2 G/DL (ref 3.5–5.7)
ALP SERPL-CCNC: 50 IU/L (ref 34–125)
ALT SERPL-CCNC: 14 IU/L (ref 7–52)
ANION GAP SERPL CALC-SCNC: 6 MEQ/L (ref 3–15)
AST SERPL-CCNC: 14 IU/L (ref 13–39)
B-HCG UR QL: NEGATIVE
BACTERIA URNS QL MICRO: ABNORMAL /HPF
BASOPHILS # BLD: 0.03 K/UL (ref 0.01–0.1)
BASOPHILS NFR BLD: 0.4 %
BILIRUB SERPL-MCNC: 0.5 MG/DL (ref 0.3–1.2)
BILIRUB UR QL STRIP.AUTO: NEGATIVE MG/DL
BUN SERPL-MCNC: 8 MG/DL (ref 7–25)
CALCIUM SERPL-MCNC: 9.3 MG/DL (ref 8.6–10.3)
CHLORIDE SERPL-SCNC: 106 MEQ/L (ref 98–107)
CLARITY UR REFRACT.AUTO: CLEAR
CO2 SERPL-SCNC: 25 MEQ/L (ref 21–31)
COLOR UR AUTO: COLORLESS
CREAT SERPL-MCNC: 0.7 MG/DL (ref 0.6–1.2)
DIFFERENTIAL METHOD BLD: ABNORMAL
EGFRCR SERPLBLD CKD-EPI 2021: >60 ML/MIN/1.73M*2
EOSINOPHIL # BLD: 0.02 K/UL (ref 0.04–0.36)
EOSINOPHIL NFR BLD: 0.3 %
ERYTHROCYTE [DISTWIDTH] IN BLOOD BY AUTOMATED COUNT: 12.5 % (ref 11.7–14.4)
GLUCOSE SERPL-MCNC: 90 MG/DL (ref 70–99)
GLUCOSE UR STRIP.AUTO-MCNC: NEGATIVE MG/DL
HCT VFR BLD AUTO: 37.6 % (ref 35–45)
HGB BLD-MCNC: 12.9 G/DL (ref 11.8–15.7)
HGB UR QL STRIP.AUTO: ABNORMAL
HYALINE CASTS #/AREA URNS LPF: ABNORMAL /LPF
IMM GRANULOCYTES # BLD AUTO: 0.01 K/UL (ref 0–0.08)
IMM GRANULOCYTES NFR BLD AUTO: 0.1 %
KETONES UR STRIP.AUTO-MCNC: NEGATIVE MG/DL
LEUKOCYTE ESTERASE UR QL STRIP.AUTO: NEGATIVE
LIPASE SERPL-CCNC: 27 U/L (ref 11–82)
LYMPHOCYTES # BLD: 1.64 K/UL (ref 1.2–3.5)
LYMPHOCYTES NFR BLD: 21.5 %
MCH RBC QN AUTO: 31.6 PG (ref 28–33.2)
MCHC RBC AUTO-ENTMCNC: 34.3 G/DL (ref 32.2–35.5)
MCV RBC AUTO: 92.2 FL (ref 83–98)
MONOCYTES # BLD: 0.54 K/UL (ref 0.28–0.8)
MONOCYTES NFR BLD: 7.1 %
MUCOUS THREADS URNS QL MICRO: ABNORMAL /LPF
NEUTROPHILS # BLD: 5.4 K/UL (ref 1.7–7)
NEUTS SEG NFR BLD: 70.6 %
NITRITE UR QL STRIP.AUTO: NEGATIVE
NRBC BLD-RTO: 0 %
PH UR STRIP.AUTO: 7.5 [PH]
PLATELET # BLD AUTO: 215 K/UL (ref 150–369)
PMV BLD AUTO: 9.8 FL (ref 9.4–12.3)
POCT TEST: NORMAL
POTASSIUM SERPL-SCNC: 4.2 MEQ/L (ref 3.5–5.1)
PROT SERPL-MCNC: 7.5 G/DL (ref 6–8.2)
PROT UR QL STRIP.AUTO: NEGATIVE
RBC # BLD AUTO: 4.08 M/UL (ref 3.93–5.22)
RBC #/AREA URNS HPF: ABNORMAL /HPF
SODIUM SERPL-SCNC: 137 MEQ/L (ref 136–145)
SP GR UR REFRACT.AUTO: 1.01
SQUAMOUS URNS QL MICRO: ABNORMAL /HPF
UROBILINOGEN UR STRIP-ACNC: 0.2 EU/DL
WBC # BLD AUTO: 7.64 K/UL (ref 3.8–10.5)
WBC #/AREA URNS HPF: ABNORMAL /HPF

## 2025-04-09 PROCEDURE — 83690 ASSAY OF LIPASE: CPT | Performed by: EMERGENCY MEDICINE

## 2025-04-09 PROCEDURE — 63600105 HC IODINE BASED CONTRAST: Mod: JW | Performed by: EMERGENCY MEDICINE

## 2025-04-09 PROCEDURE — 74177 CT ABD & PELVIS W/CONTRAST: CPT

## 2025-04-09 PROCEDURE — 3E033GC INTRODUCTION OF OTHER THERAPEUTIC SUBSTANCE INTO PERIPHERAL VEIN, PERCUTANEOUS APPROACH: ICD-10-PCS | Performed by: EMERGENCY MEDICINE

## 2025-04-09 PROCEDURE — 99284 EMERGENCY DEPT VISIT MOD MDM: CPT | Mod: 25

## 2025-04-09 PROCEDURE — 80053 COMPREHEN METABOLIC PANEL: CPT | Performed by: EMERGENCY MEDICINE

## 2025-04-09 PROCEDURE — 96361 HYDRATE IV INFUSION ADD-ON: CPT

## 2025-04-09 PROCEDURE — 25800000 HC PHARMACY IV SOLUTIONS: Performed by: EMERGENCY MEDICINE

## 2025-04-09 PROCEDURE — 63600000 HC DRUGS/DETAIL CODE: Mod: JZ | Performed by: EMERGENCY MEDICINE

## 2025-04-09 PROCEDURE — 81001 URINALYSIS AUTO W/SCOPE: CPT | Performed by: EMERGENCY MEDICINE

## 2025-04-09 PROCEDURE — 3E0337Z INTRODUCTION OF ELECTROLYTIC AND WATER BALANCE SUBSTANCE INTO PERIPHERAL VEIN, PERCUTANEOUS APPROACH: ICD-10-PCS | Performed by: EMERGENCY MEDICINE

## 2025-04-09 PROCEDURE — 85025 COMPLETE CBC W/AUTO DIFF WBC: CPT | Performed by: EMERGENCY MEDICINE

## 2025-04-09 PROCEDURE — 96375 TX/PRO/DX INJ NEW DRUG ADDON: CPT

## 2025-04-09 PROCEDURE — 96374 THER/PROPH/DIAG INJ IV PUSH: CPT | Mod: 59

## 2025-04-09 PROCEDURE — 3E0333Z INTRODUCTION OF ANTI-INFLAMMATORY INTO PERIPHERAL VEIN, PERCUTANEOUS APPROACH: ICD-10-PCS | Performed by: EMERGENCY MEDICINE

## 2025-04-09 PROCEDURE — 36415 COLL VENOUS BLD VENIPUNCTURE: CPT | Performed by: EMERGENCY MEDICINE

## 2025-04-09 RX ORDER — NAPROXEN 500 MG/1
500 TABLET ORAL
Qty: 30 TABLET | Refills: 0 | Status: SHIPPED
Start: 2025-04-09 | End: 2025-10-06

## 2025-04-09 RX ORDER — IOPAMIDOL 755 MG/ML
85 INJECTION, SOLUTION INTRAVASCULAR
Status: COMPLETED | OUTPATIENT
Start: 2025-04-09 | End: 2025-04-09

## 2025-04-09 RX ORDER — ONDANSETRON HYDROCHLORIDE 2 MG/ML
4 INJECTION, SOLUTION INTRAVENOUS ONCE
Status: COMPLETED | OUTPATIENT
Start: 2025-04-09 | End: 2025-04-09

## 2025-04-09 RX ORDER — NAPROXEN 500 MG/1
500 TABLET ORAL
Qty: 30 TABLET | Refills: 0 | Status: SHIPPED | OUTPATIENT
Start: 2025-04-09 | End: 2025-04-09

## 2025-04-09 RX ORDER — KETOROLAC TROMETHAMINE 15 MG/ML
15 INJECTION, SOLUTION INTRAMUSCULAR; INTRAVENOUS ONCE
Status: COMPLETED | OUTPATIENT
Start: 2025-04-09 | End: 2025-04-09

## 2025-04-09 RX ADMIN — IOPAMIDOL 85 ML: 755 INJECTION, SOLUTION INTRAVENOUS at 17:52

## 2025-04-09 RX ADMIN — KETOROLAC TROMETHAMINE 15 MG: 15 INJECTION, SOLUTION INTRAMUSCULAR; INTRAVENOUS at 16:02

## 2025-04-09 RX ADMIN — ONDANSETRON 4 MG: 2 INJECTION INTRAMUSCULAR; INTRAVENOUS at 16:02

## 2025-04-09 RX ADMIN — SODIUM CHLORIDE 1000 ML: 9 INJECTION, SOLUTION INTRAVENOUS at 16:01

## 2025-04-09 NOTE — DISCHARGE INSTRUCTIONS
Please follow-up with your primary care doctor, call them for an emergency department follow-up appointment.    Please talk to your primary care doctor about medications that were prescribed to you today.    Radiology review of your studies (XRAYs, CT Scans, Ultrasounds, MRI scans) may still be pending. Preliminary results may be available today but final written reports may not be available until tomorrow. Important findings may be included in the final radiology review.   If there is something that the radiologist read, you could be notified by phone of a finding that was not seen during your ED visit.    Cultures and uncommon labs may take 2 days or more before results are available. Please ask about all pending tests until the final results are known. You may not be notified of important test results unless you ask for these when you call or when you follow up.    If you find that you start getting a temperature over 100.4, severe pain, severe vomiting, easy to return to your closest emergency department.    Thank you for allowing us to take care of you today and for choosing [facility]!  If you have any other questions, the emergency department phone number is [phone]    -Dr. Mcghee and the [facility] Emergency Department team

## 2025-04-09 NOTE — ED PROVIDER NOTES
Emergency Medicine Note  HPI   HISTORY OF PRESENT ILLNESS     24-year-old female with a past medical history of an interstitial cystitis, concussion,Patient presents to the Emergency Department with abdominal pain that has been present for a few days. The pain settled in the lower right quadrant last night, causing significant discomfort and sleep disturbance. The patient reports that any movement, including breathing, laughing, and talking, exacerbates the pain.    The abdominal pain is described as constant and localized to the right lower quadrant. It is aggravated by movement and relieved with pressure, but returns when pressure is released. The patient denies any previous similar episodes. Associated symptoms include nausea, and a low-grade fever of 99.9°F this morning. The patient reports a decreased appetite and mentions being in the ovulatory phase of her menstrual cycle. She denies any abnormal vaginal bleeding or discharge.    The patient also reports unintentional weight loss of 20-25 pounds since January, which she initially attributed to holiday stress but notes it is worsening. She has a history of interstitial cystitis, which typically causes trace blood in her urine.     states she had a possible ovarian cyst at age 16, though no ultrasound was performed at that time to confirm the diagnosis.          History provided by:  Patient and medical records   used: No          Patient History   PAST HISTORY     Reviewed from Nursing Triage:       Past Medical History:   Diagnosis Date    Abdominal pain 12/12/2024    Acquired tight Achilles tendon 03/11/2015    Anorexia     h/o anorexia last year, not currently anorexic    Asthma     Cervicalgia 12/12/2024    Closed fracture of distal end of radius 12/13/2023    Concussion     3 years ago    Concussion with loss of consciousness of 30 minutes or less, initial encounter 12/12/2024    Concussion without loss of consciousness, subsequent  encounter 12/12/2024    Crushing injury of left foot, sequela 12/12/2024    Femoral anteversion 05/07/2014    Leg length discrepancy     Other injury of other specified muscles, fascia and tendons at wrist and hand level, right hand, initial encounter 05/20/2016    Syncope, vasovagal     November 2018       No past surgical history on file.    No family history on file.    Social History     Tobacco Use    Smoking status: Never    Smokeless tobacco: Never   Substance Use Topics    Alcohol use: Never    Drug use: Never         Review of Systems   REVIEW OF SYSTEMS     Review of Systems      VITALS     ED Vitals      Date/Time Temp Pulse Resp BP SpO2 [facility]   04/09/25 1837 -- 70 16 113/67 100 % EE   04/09/25 1357 36.9 °C (98.4 °F) 102 18 160/94 100 % LTC          Pulse Ox %: 98 % (04/09/25 1841)  Pulse Ox Interpretation: Normal (04/09/25 1841)  Heart Rate: 75 (04/09/25 1841)  Rhythm Strip Interpretation: Normal Sinus Rhythm (04/09/25 1841)     Physical Exam   PHYSICAL EXAM     Physical Exam  Vitals and nursing note reviewed.   Constitutional:       General: She is not in acute distress.     Appearance: She is well-developed.   HENT:      Head: Normocephalic and atraumatic.   Eyes:      Conjunctiva/sclera: Conjunctivae normal.   Cardiovascular:      Rate and Rhythm: Normal rate and regular rhythm.      Heart sounds: No murmur heard.  Pulmonary:      Effort: Pulmonary effort is normal. No respiratory distress.      Breath sounds: Normal breath sounds.   Abdominal:      General: Bowel sounds are normal.      Palpations: Abdomen is soft.      Tenderness: There is no abdominal tenderness.      Comments: Mild tenderness in the deep right pelvis negative McBurney's   Musculoskeletal:         General: Normal range of motion.      Cervical back: Neck supple.   Skin:     General: Skin is warm and dry.   Neurological:      Mental Status: She is alert.   Psychiatric:         Behavior: Behavior normal.           PROCEDURES      Procedures     DATA     Results       Procedure Component Value Units Date/Time    Lipase, if pain is above umbilicus [[id number]]  (Normal) Collected: 04/09/25 1543    Specimen: Blood, Venous Updated: 04/09/25 1718     Lipase 27 U/L     Comprehensive metabolic panel [[id number]]  (Normal) Collected: 04/09/25 1543    Specimen: Blood, Venous Updated: 04/09/25 1718     Sodium 137 mEQ/L      Potassium 4.2 mEQ/L      Comment: Results obtained on plasma. Plasma Potassium values may be up to 0.4 mEQ/L less than serum values. The differences may be greater for patients with high platelet or white cell counts.        Chloride 106 mEQ/L      CO2 25 mEQ/L      BUN 8 mg/dL      Creatinine 0.7 mg/dL      Glucose 90 mg/dL      Calcium 9.3 mg/dL      AST (SGOT) 14 IU/L      ALT (SGPT) 14 IU/L      Alkaline Phosphatase 50 IU/L      Total Protein 7.5 g/dL      Comment: Test performed on plasma which typically contains approximately 0.4 g/dL more protein than serum.        Albumin 4.2 g/dL      Bilirubin, Total 0.5 mg/dL      eGFR >60.0 mL/min/1.73m*2      Comment: Calculation based on the Chronic Kidney Disease Epidemiology Collaboration (CKD-EPI) equation refit without adjustment for race.        Anion Gap 6 mEQ/L     Urinalysis with Reflex Culture [[id number]]  (Abnormal) Collected: 04/09/25 1543    Specimen: Urine, Clean Catch Updated: 04/09/25 1605    Narrative:      The following orders were created for panel order Urinalysis with Reflex Culture.  Procedure                               Abnormality         Status                     ---------                               -----------         ------                     UA Reflex to Culture (Ma...[[id number]]  Abnormal            Final result               UA Microscopic[[id number]]               Abnormal            Final result                 Please view results for these tests on the individual orders.    UA Microscopic [[id number]]  (Abnormal) Collected: 04/09/25 1543     Specimen: Urine, Clean Catch Updated: 04/09/25 1605     RBC, Urine 0 TO 4 /HPF      WBC, Urine 0 TO 3 /HPF      Squamous Epithelial Rare /hpf      Hyaline Cast None Seen /lpf      Bacteria, Urine Rare /HPF      Mucus Rare /LPF     UA Reflex to Culture (Macroscopic) [[id number]]  (Abnormal) Collected: 04/09/25 1543    Specimen: Urine, Clean Catch Updated: 04/09/25 1603     Color, Urine Colorless     Clarity, Urine Clear     Specific Gravity, Urine 1.009     pH, Urine 7.5     Leukocyte Esterase Negative     Comment: Results can be falsely negative due to high specific gravity, some antibiotics, glucose >3 g/dl, or WBC other than neutrophils.        Nitrite, Urine Negative     Protein, Urine Negative     Glucose, Urine Negative mg/dL      Ketones, Urine Negative mg/dL      Urobilinogen, Urine 0.2 EU/dL      Bilirubin, Urine Negative mg/dL      Blood, Urine Trace     Comment: The sensitivity of the occult blood test is equivalent to approximately 4 intact RBC/HPF.       CBC and differential [[id number]]  (Abnormal) Collected: 04/09/25 1543    Specimen: Blood, Venous Updated: 04/09/25 1555     WBC 7.64 K/uL      RBC 4.08 M/uL      Hemoglobin 12.9 g/dL      Hematocrit 37.6 %      MCV 92.2 fL      MCH 31.6 pg      MCHC 34.3 g/dL      RDW 12.5 %      Platelets 215 K/uL      MPV 9.8 fL      Differential Type Auto     nRBC 0.0 %      Immature Granulocytes 0.1 %      Neutrophils 70.6 %      Lymphocytes 21.5 %      Monocytes 7.1 %      Eosinophils 0.3 %      Basophils 0.4 %      Immature Granulocytes, Absolute 0.01 K/uL      Neutrophils, Absolute 5.40 K/uL      Lymphocytes, Absolute 1.64 K/uL      Monocytes, Absolute 0.54 K/uL      Eosinophils, Absolute 0.02 K/uL      Basophils, Absolute 0.03 K/uL             Imaging Results              CT ABDOMEN PELVIS WITH IV CONTRAST (Final result)  Result time 04/09/25 18:39:00      Final result                   Impression:    IMPRESSION:  No evidence of acute abdominopelvic pathology.                Narrative:    CLINICAL HISTORY:     Right lower quadrant abdominal pain.    COMPARISON:    CT abdomen and pelvis 1/6/2024 and MRI pelvis 12/13/2024.      COMMENT:  Technique: CT of the Abdomen and Pelvis was performed with IV contrast: 85 cc of  Isovue-370 was administered without adverse reaction.  Oral contrast was not  administered.  CT DOSE:  One or more dose reduction techniques (e.g. automated exposure  control, adjustment of the mA and/or kV according to patient size, use of  iterative reconstruction technique) utilized for this examination.        COMMENT:    LOWER CHEST:    Lower lungs: Within normal limits.  Visualized heart: Within normal limits.  Distal esophagus:  Normal caliber.  Visualized vessels:  Within normal limits    ABDOMEN    Liver: within normal limits.  Bile ducts: Normal caliber.  Gallbladder: No calcified gallstones.  Normal caliber wall.  Pancreas: Normal  Spleen: within normal limits.  Adrenals: within normal limits.  Kidneys: within normal limits.  Ureters: No obstructing calculi  Delayed imaging was not performed.    PELVIS:  Reproductive organs: No pelvic masses. There is a 1.8 cm dominant follicle right  ovary.  Bladder: within normal limits.    Peritoneum: No pneumoperitoneum or ascites.    Bowel: Normal caliber. There is no bowel wall thickening or adjacent  inflammation. A normal appendix is seen. The stomach is unremarkable.  Vessels:  within normal limits.  Lymph Nodes:  No enlarged nodes  Abdominal wall: within normal limits.  Bones: within normal limits.                                        No orders to display       Scoring tools                                  ED Course & MDM   MDM / ED COURSE / CLINICAL IMPRESSION / DISPO     Medical Decision Making  Problems Addressed:  Cyst of right ovary: complicated acute illness or injury with systemic symptoms    Amount and/or Complexity of Data Reviewed  Independent Historian: friend     Details: Boyfriend at  bedside  External Data Reviewed: notes.     Details: Reviewed previous ER notes March 2025  Labs: ordered. Decision-making details documented in ED Course.  Radiology: ordered and independent interpretation performed. Decision-making details documented in ED Course.     Details: Independently evaluated and agree w/ radiology      Risk  Prescription drug management.        ED Course as of 04/09/25 1916 Wed Apr 09, 2025 1915 Urinalysis with Reflex Culture(!)  Normal UA [RL]   1916 Patient presents today with right lower abdominal pain has been ongoing for the past couple of days.  Has gotten worse.  Mild nausea.  Exam with mild tenderness deep in the right lower quadrant.  No vaginal discharge or abnormal bleeding.  CT with a 1.8 cm dominant follicle on the right ovary.  She looks very comfortable.  Doubt ovarian torsion at this time given the dominant follicle is 1.8 cm.  Aware to return to ER any worsening symptoms.  Patient given signs and symptoms of torsion but at this time it is reasonable to not complete ultrasound secondary to size of that follicle [RL]      ED Course User Index  [RL] Saadia Mcghee DO     Clinical Impression      Cyst of right ovary     _________________       ED Disposition   Discharge                       Saadia Mcghee DO  04/09/25 1916